# Patient Record
Sex: FEMALE | Race: WHITE
[De-identification: names, ages, dates, MRNs, and addresses within clinical notes are randomized per-mention and may not be internally consistent; named-entity substitution may affect disease eponyms.]

---

## 2024-05-20 ENCOUNTER — DASHBOARD ENCOUNTERS (OUTPATIENT)
Age: 64
End: 2024-05-20

## 2024-05-20 ENCOUNTER — ERX REFILL RESPONSE (OUTPATIENT)
Dept: URBAN - METROPOLITAN AREA CLINIC 105 | Facility: CLINIC | Age: 64
End: 2024-05-20

## 2024-05-20 ENCOUNTER — OFFICE VISIT (OUTPATIENT)
Dept: URBAN - METROPOLITAN AREA CLINIC 105 | Facility: CLINIC | Age: 64
End: 2024-05-20
Payer: COMMERCIAL

## 2024-05-20 VITALS
TEMPERATURE: 97.3 F | SYSTOLIC BLOOD PRESSURE: 101 MMHG | DIASTOLIC BLOOD PRESSURE: 68 MMHG | BODY MASS INDEX: 15.34 KG/M2 | WEIGHT: 103.6 LBS | HEIGHT: 69 IN | HEART RATE: 108 BPM

## 2024-05-20 DIAGNOSIS — K64.8 OTHER HEMORRHOIDS: ICD-10-CM

## 2024-05-20 DIAGNOSIS — R63.6 UNDERWEIGHT: ICD-10-CM

## 2024-05-20 DIAGNOSIS — R19.7 DIARRHEA, UNSPECIFIED TYPE: ICD-10-CM

## 2024-05-20 DIAGNOSIS — K59.09 OTHER CONSTIPATION: ICD-10-CM

## 2024-05-20 PROBLEM — 14760008: Status: ACTIVE | Noted: 2024-05-20

## 2024-05-20 PROCEDURE — 99204 OFFICE O/P NEW MOD 45 MIN: CPT | Performed by: INTERNAL MEDICINE

## 2024-05-20 RX ORDER — HYDROCORTISONE ACETATE 25 MG/1
1 SUPPOSITORY SUPPOSITORY RECTAL ONCE A DAY
Qty: 14 | Refills: 0 | OUTPATIENT
Start: 2024-05-20 | End: 2024-06-03

## 2024-05-20 RX ORDER — HYDROCORTISONE ACETATE 25 MG/1
1 SUPPOSITORY SUPPOSITORY RECTAL ONCE A DAY
Qty: 14 | Refills: 0 | OUTPATIENT

## 2024-05-20 RX ORDER — BUSPIRONE HYDROCHLORIDE 30 MG/1
1 TABLET TABLET ORAL TWICE A DAY
Status: ACTIVE | COMMUNITY

## 2024-05-20 RX ORDER — SIMVASTATIN 40 MG
1 TABLET IN THE EVENING TABLET ORAL ONCE A DAY
Status: ACTIVE | COMMUNITY

## 2024-05-20 RX ORDER — SERTRALINE HYDROCHLORIDE 100 MG/1
1 TABLET TABLET, FILM COATED ORAL ONCE A DAY
Status: ACTIVE | COMMUNITY

## 2024-05-20 RX ORDER — LEVOTHYROXINE SODIUM 150 UG/1
1 TABLET IN THE MORNING ON AN EMPTY STOMACH TABLET ORAL ONCE A DAY
Status: ACTIVE | COMMUNITY

## 2024-06-18 ENCOUNTER — OFFICE VISIT (OUTPATIENT)
Dept: URBAN - METROPOLITAN AREA CLINIC 105 | Facility: CLINIC | Age: 64
End: 2024-06-18

## 2024-06-18 RX ORDER — HYDROCORTISONE ACETATE 25 MG/1
1 SUPPOSITORY SUPPOSITORY RECTAL ONCE A DAY
Qty: 14 | Refills: 0 | COMMUNITY

## 2024-06-18 RX ORDER — SIMVASTATIN 40 MG
1 TABLET IN THE EVENING TABLET ORAL ONCE A DAY
COMMUNITY

## 2024-06-18 RX ORDER — LEVOTHYROXINE SODIUM 150 UG/1
1 TABLET IN THE MORNING ON AN EMPTY STOMACH TABLET ORAL ONCE A DAY
COMMUNITY

## 2024-06-18 RX ORDER — BUSPIRONE HYDROCHLORIDE 30 MG/1
1 TABLET TABLET ORAL TWICE A DAY
COMMUNITY

## 2024-06-18 RX ORDER — SERTRALINE HYDROCHLORIDE 100 MG/1
1 TABLET TABLET, FILM COATED ORAL ONCE A DAY
COMMUNITY

## 2024-06-18 NOTE — HPI-ZZZTODAY'S VISIT
05/20/2024 63-year-old female with PMH of anxiety/depression, insomnia, and vitamin D deficiency, referred by Dr. Fatemeh Gamboa for upset stomach and constipation. Pt states 1 yr ago, she had a colonoscopy (she does not recall where she had this) and there were some polyps removed; told 2-3 yr recall as far as she recalls. Since then she has had liquid diarrhea alternating with constipation. When she is constipated, the stool is small kimberly, like "raisinettes." States it is "severe" and she has hemorrhoids from straining - some rectal irritation and blood on toilet paper. She sometimes has difficulty making it to the bathroom. When she eats, she has to have a BM. She admits that she has had to manually digitize to remove stool.  She typically has 2-4 BMs/day. She has BMs every day, does not go without having one.  She has tried Metamucil but stopped taking it because of the diarrhea. She tried preparation H for the hemorrhoids but did not help.  She denies abdominal pain, but thinks she can "feel her intestines moving." She notes a lot of gurgling. Denies N/V, heartburn. She has noted weight loss of about 15 lb in the last year. Weight was 107 lb at her physical in 2/2024. She admits to low appetite, but states she eats breakfast and dinner. Thinks this has been an issue in the last 1 yr.  06/18/2024 Pt presents for f/u. At last visit, KUB was ordered but not done. Pt was rx'd Anusol suppositories and recommended MiraLax bowel prep followed by daily MiraLax + Metamucil. Per Dr. Sharp - Jay weight loss diagnosis, consider repeat colonoscopy if weight loss not improved with tx  Labs  2/14/2024: CBC, CMP, TSH, vitamin D normal.  A1c 5.7.